# Patient Record
Sex: FEMALE | Race: OTHER | HISPANIC OR LATINO
[De-identification: names, ages, dates, MRNs, and addresses within clinical notes are randomized per-mention and may not be internally consistent; named-entity substitution may affect disease eponyms.]

---

## 2019-02-14 ENCOUNTER — RESULT REVIEW (OUTPATIENT)
Age: 49
End: 2019-02-14

## 2019-02-14 ENCOUNTER — TRANSCRIPTION ENCOUNTER (OUTPATIENT)
Age: 49
End: 2019-02-14

## 2019-02-14 ENCOUNTER — INPATIENT (INPATIENT)
Facility: HOSPITAL | Age: 49
LOS: 4 days | Discharge: HOME | DRG: 346 | End: 2019-02-19
Attending: SPECIALIST | Admitting: SPECIALIST
Payer: MEDICAID

## 2019-02-14 VITALS
WEIGHT: 149.91 LBS | HEART RATE: 90 BPM | TEMPERATURE: 98 F | RESPIRATION RATE: 20 BRPM | SYSTOLIC BLOOD PRESSURE: 143 MMHG | OXYGEN SATURATION: 99 % | DIASTOLIC BLOOD PRESSURE: 88 MMHG | HEIGHT: 64 IN

## 2019-02-14 DIAGNOSIS — K63.5 POLYP OF COLON: ICD-10-CM

## 2019-02-14 DIAGNOSIS — Z90.49 ACQUIRED ABSENCE OF OTHER SPECIFIED PARTS OF DIGESTIVE TRACT: Chronic | ICD-10-CM

## 2019-02-14 LAB
ABO RH CONFIRMATION: SIGNIFICANT CHANGE UP
ALBUMIN SERPL ELPH-MCNC: 3.7 G/DL — SIGNIFICANT CHANGE UP (ref 3.5–5)
ALP SERPL-CCNC: 100 U/L — SIGNIFICANT CHANGE UP (ref 40–120)
ALT FLD-CCNC: 24 U/L DA — SIGNIFICANT CHANGE UP (ref 10–60)
ANION GAP SERPL CALC-SCNC: 8 MMOL/L — SIGNIFICANT CHANGE UP (ref 5–17)
APPEARANCE UR: ABNORMAL
AST SERPL-CCNC: 21 U/L — SIGNIFICANT CHANGE UP (ref 10–40)
BASOPHILS # BLD AUTO: 0.02 K/UL — SIGNIFICANT CHANGE UP (ref 0–0.2)
BASOPHILS NFR BLD AUTO: 0.4 % — SIGNIFICANT CHANGE UP (ref 0–2)
BILIRUB SERPL-MCNC: 0.6 MG/DL — SIGNIFICANT CHANGE UP (ref 0.2–1.2)
BILIRUB UR-MCNC: NEGATIVE — SIGNIFICANT CHANGE UP
BLD GP AB SCN SERPL QL: SIGNIFICANT CHANGE UP
BUN SERPL-MCNC: 10 MG/DL — SIGNIFICANT CHANGE UP (ref 7–18)
CALCIUM SERPL-MCNC: 8.7 MG/DL — SIGNIFICANT CHANGE UP (ref 8.4–10.5)
CHLORIDE SERPL-SCNC: 107 MMOL/L — SIGNIFICANT CHANGE UP (ref 96–108)
CO2 SERPL-SCNC: 24 MMOL/L — SIGNIFICANT CHANGE UP (ref 22–31)
COLOR SPEC: YELLOW — SIGNIFICANT CHANGE UP
CREAT SERPL-MCNC: 0.7 MG/DL — SIGNIFICANT CHANGE UP (ref 0.5–1.3)
DIFF PNL FLD: NEGATIVE — SIGNIFICANT CHANGE UP
EOSINOPHIL # BLD AUTO: 0.09 K/UL — SIGNIFICANT CHANGE UP (ref 0–0.5)
EOSINOPHIL NFR BLD AUTO: 1.7 % — SIGNIFICANT CHANGE UP (ref 0–6)
GLUCOSE SERPL-MCNC: 90 MG/DL — SIGNIFICANT CHANGE UP (ref 70–99)
GLUCOSE UR QL: NEGATIVE — SIGNIFICANT CHANGE UP
HCG SERPL-ACNC: <1 MIU/ML — SIGNIFICANT CHANGE UP
HCT VFR BLD CALC: 38.5 % — SIGNIFICANT CHANGE UP (ref 34.5–45)
HGB BLD-MCNC: 12.5 G/DL — SIGNIFICANT CHANGE UP (ref 11.5–15.5)
IMM GRANULOCYTES NFR BLD AUTO: 0.2 % — SIGNIFICANT CHANGE UP (ref 0–1.5)
KETONES UR-MCNC: NEGATIVE — SIGNIFICANT CHANGE UP
LEUKOCYTE ESTERASE UR-ACNC: ABNORMAL
LIDOCAIN IGE QN: 163 U/L — SIGNIFICANT CHANGE UP (ref 73–393)
LYMPHOCYTES # BLD AUTO: 1.53 K/UL — SIGNIFICANT CHANGE UP (ref 1–3.3)
LYMPHOCYTES # BLD AUTO: 28.2 % — SIGNIFICANT CHANGE UP (ref 13–44)
MCHC RBC-ENTMCNC: 27.5 PG — SIGNIFICANT CHANGE UP (ref 27–34)
MCHC RBC-ENTMCNC: 32.5 GM/DL — SIGNIFICANT CHANGE UP (ref 32–36)
MCV RBC AUTO: 84.6 FL — SIGNIFICANT CHANGE UP (ref 80–100)
MONOCYTES # BLD AUTO: 0.58 K/UL — SIGNIFICANT CHANGE UP (ref 0–0.9)
MONOCYTES NFR BLD AUTO: 10.7 % — SIGNIFICANT CHANGE UP (ref 2–14)
NEUTROPHILS # BLD AUTO: 3.19 K/UL — SIGNIFICANT CHANGE UP (ref 1.8–7.4)
NEUTROPHILS NFR BLD AUTO: 58.8 % — SIGNIFICANT CHANGE UP (ref 43–77)
NITRITE UR-MCNC: NEGATIVE — SIGNIFICANT CHANGE UP
NRBC # BLD: 0 /100 WBCS — SIGNIFICANT CHANGE UP (ref 0–0)
PH UR: 6 — SIGNIFICANT CHANGE UP (ref 5–8)
PLATELET # BLD AUTO: 262 K/UL — SIGNIFICANT CHANGE UP (ref 150–400)
POTASSIUM SERPL-MCNC: 3.9 MMOL/L — SIGNIFICANT CHANGE UP (ref 3.5–5.3)
POTASSIUM SERPL-SCNC: 3.9 MMOL/L — SIGNIFICANT CHANGE UP (ref 3.5–5.3)
PROT SERPL-MCNC: 7.6 G/DL — SIGNIFICANT CHANGE UP (ref 6–8.3)
PROT UR-MCNC: 15
RBC # BLD: 4.55 M/UL — SIGNIFICANT CHANGE UP (ref 3.8–5.2)
RBC # FLD: 18.8 % — HIGH (ref 10.3–14.5)
SODIUM SERPL-SCNC: 139 MMOL/L — SIGNIFICANT CHANGE UP (ref 135–145)
SP GR SPEC: 1 — LOW (ref 1.01–1.02)
UROBILINOGEN FLD QL: NEGATIVE — SIGNIFICANT CHANGE UP
WBC # BLD: 5.42 K/UL — SIGNIFICANT CHANGE UP (ref 3.8–10.5)
WBC # FLD AUTO: 5.42 K/UL — SIGNIFICANT CHANGE UP (ref 3.8–10.5)

## 2019-02-14 PROCEDURE — 99223 1ST HOSP IP/OBS HIGH 75: CPT | Mod: 57

## 2019-02-14 PROCEDURE — 99285 EMERGENCY DEPT VISIT HI MDM: CPT

## 2019-02-14 PROCEDURE — 71046 X-RAY EXAM CHEST 2 VIEWS: CPT | Mod: 26

## 2019-02-14 PROCEDURE — 88309 TISSUE EXAM BY PATHOLOGIST: CPT | Mod: 26

## 2019-02-14 RX ORDER — SOD SULF/SODIUM/NAHCO3/KCL/PEG
4000 SOLUTION, RECONSTITUTED, ORAL ORAL ONCE
Qty: 0 | Refills: 0 | Status: DISCONTINUED | OUTPATIENT
Start: 2019-02-14 | End: 2019-02-15

## 2019-02-14 RX ORDER — ENOXAPARIN SODIUM 100 MG/ML
40 INJECTION SUBCUTANEOUS EVERY 24 HOURS
Qty: 0 | Refills: 0 | Status: DISCONTINUED | OUTPATIENT
Start: 2019-02-14 | End: 2019-02-19

## 2019-02-14 RX ORDER — NEOMYCIN SULFATE 500 MG/1
1000 TABLET ORAL
Qty: 0 | Refills: 0 | Status: DISCONTINUED | OUTPATIENT
Start: 2019-02-14 | End: 2019-02-15

## 2019-02-14 RX ORDER — ACETAMINOPHEN 500 MG
650 TABLET ORAL EVERY 6 HOURS
Qty: 0 | Refills: 0 | Status: DISCONTINUED | OUTPATIENT
Start: 2019-02-14 | End: 2019-02-15

## 2019-02-14 RX ORDER — SODIUM CHLORIDE 9 MG/ML
1000 INJECTION, SOLUTION INTRAVENOUS
Qty: 0 | Refills: 0 | Status: DISCONTINUED | OUTPATIENT
Start: 2019-02-14 | End: 2019-02-19

## 2019-02-14 RX ORDER — ONDANSETRON 8 MG/1
4 TABLET, FILM COATED ORAL EVERY 6 HOURS
Qty: 0 | Refills: 0 | Status: DISCONTINUED | OUTPATIENT
Start: 2019-02-14 | End: 2019-02-19

## 2019-02-14 RX ORDER — ERYTHROMYCIN ETHYLSUCCINATE 400 MG
1000 TABLET ORAL
Qty: 0 | Refills: 0 | Status: DISCONTINUED | OUTPATIENT
Start: 2019-02-14 | End: 2019-02-15

## 2019-02-14 RX ORDER — KETOROLAC TROMETHAMINE 30 MG/ML
30 SYRINGE (ML) INJECTION ONCE
Qty: 0 | Refills: 0 | Status: DISCONTINUED | OUTPATIENT
Start: 2019-02-14 | End: 2019-02-15

## 2019-02-14 RX ADMIN — NEOMYCIN SULFATE 1000 MILLIGRAM(S): 500 TABLET ORAL at 17:50

## 2019-02-14 RX ADMIN — Medication 1000 MILLIGRAM(S): at 17:51

## 2019-02-14 RX ADMIN — Medication 1000 MILLIGRAM(S): at 14:12

## 2019-02-14 RX ADMIN — SODIUM CHLORIDE 110 MILLILITER(S): 9 INJECTION, SOLUTION INTRAVENOUS at 16:03

## 2019-02-14 RX ADMIN — NEOMYCIN SULFATE 1000 MILLIGRAM(S): 500 TABLET ORAL at 14:11

## 2019-02-14 NOTE — ED PROVIDER NOTE - CLINICAL SUMMARY MEDICAL DECISION MAKING FREE TEXT BOX
50 y/o F pt with hx of polyps, sent here for evaluation by general surgery. Spoke with general surgery who will evaluate patient.

## 2019-02-14 NOTE — ED PROVIDER NOTE - OBJECTIVE STATEMENT
# [id number]    48 y/o F pt with a PMHx of polyps, presents to the ED sent to see Dr. Orlando. Patient reports she was told to come through the ER at 9am because he has to get surgery done to remove polyps. Patient states Dr. Orlando told her to come do the ED but he wants to see her first. Patient denies pain, vaginal bleeding, nausea, vomiting, fevers, dysuria, urinary urgency, urinary frequency, or any other symptoms.

## 2019-02-14 NOTE — H&P ADULT - ATTENDING COMMENTS
Agree with above  Condition discussed with patient and family, options risks and benefits explained  Laparoscopic assisted transverse colon resection today

## 2019-02-14 NOTE — H&P ADULT - HISTORY OF PRESENT ILLNESS
49 y.o. F presents to [facility] ER c/o intermittent BRBPR for the past 3 weeks. Pt states she had a colonoscopy at [facility] 3 weeks ago and was found to have a tubulovillous traverse colon mass. Tolerating diet and having normal BM's in between episodes. Pt denies fever, chills, N/V, abd pain, diarrhea, constipation, unintentional weight loss, dizziness.

## 2019-02-14 NOTE — H&P ADULT - PROBLEM SELECTOR PLAN 1
Admit to surgery under Dr. Peace  Plan for lap assisted colon resection 2/15/19  Bowel prep today  NPO after midnight except PO meds  IVF when NPO  CXR  EKG  Medical clearance - Dr. Miles consulted  T&S  Hold 2U PRBC for OR  No need for transfusion at present time

## 2019-02-15 LAB — GLUCOSE BLDC GLUCOMTR-MCNC: 106 MG/DL — HIGH (ref 70–99)

## 2019-02-15 PROCEDURE — 44204 LAPARO PARTIAL COLECTOMY: CPT | Mod: AS

## 2019-02-15 RX ORDER — HYDROMORPHONE HYDROCHLORIDE 2 MG/ML
1 INJECTION INTRAMUSCULAR; INTRAVENOUS; SUBCUTANEOUS EVERY 4 HOURS
Qty: 0 | Refills: 0 | Status: DISCONTINUED | OUTPATIENT
Start: 2019-02-15 | End: 2019-02-19

## 2019-02-15 RX ORDER — ACETAMINOPHEN 500 MG
1000 TABLET ORAL ONCE
Qty: 0 | Refills: 0 | Status: COMPLETED | OUTPATIENT
Start: 2019-02-15 | End: 2019-02-15

## 2019-02-15 RX ORDER — ACETAMINOPHEN 500 MG
1000 TABLET ORAL ONCE
Qty: 0 | Refills: 0 | Status: COMPLETED | OUTPATIENT
Start: 2019-02-16 | End: 2019-02-16

## 2019-02-15 RX ORDER — HYDROMORPHONE HYDROCHLORIDE 2 MG/ML
0.5 INJECTION INTRAMUSCULAR; INTRAVENOUS; SUBCUTANEOUS
Qty: 0 | Refills: 0 | Status: DISCONTINUED | OUTPATIENT
Start: 2019-02-15 | End: 2019-02-15

## 2019-02-15 RX ADMIN — Medication 30 MILLIGRAM(S): at 13:09

## 2019-02-15 RX ADMIN — Medication 30 MILLIGRAM(S): at 13:24

## 2019-02-15 RX ADMIN — NEOMYCIN SULFATE 1000 MILLIGRAM(S): 500 TABLET ORAL at 02:53

## 2019-02-15 RX ADMIN — ENOXAPARIN SODIUM 40 MILLIGRAM(S): 100 INJECTION SUBCUTANEOUS at 02:53

## 2019-02-15 RX ADMIN — HYDROMORPHONE HYDROCHLORIDE 0.5 MILLIGRAM(S): 2 INJECTION INTRAMUSCULAR; INTRAVENOUS; SUBCUTANEOUS at 13:09

## 2019-02-15 RX ADMIN — Medication 400 MILLIGRAM(S): at 20:46

## 2019-02-15 RX ADMIN — ONDANSETRON 4 MILLIGRAM(S): 8 TABLET, FILM COATED ORAL at 14:49

## 2019-02-15 RX ADMIN — Medication 1000 MILLIGRAM(S): at 21:01

## 2019-02-15 RX ADMIN — HYDROMORPHONE HYDROCHLORIDE 0.5 MILLIGRAM(S): 2 INJECTION INTRAMUSCULAR; INTRAVENOUS; SUBCUTANEOUS at 13:24

## 2019-02-15 RX ADMIN — Medication 1000 MILLIGRAM(S): at 03:00

## 2019-02-15 NOTE — PROGRESS NOTE ADULT - SUBJECTIVE AND OBJECTIVE BOX
INTERVAL HPI/OVERNIGHT EVENTS:  Pt stable.   NPO   C/O minimal abdominal pain    Vital Signs Last 24 Hrs  T(C): 36.6 (15 Feb 2019 14:44), Max: 36.9 (15 Feb 2019 00:44)  T(F): 97.8 (15 Feb 2019 14:44), Max: 98.4 (15 Feb 2019 00:44)  HR: 85 (15 Feb 2019 14:44) (63 - 91)  BP: 123/74 (15 Feb 2019 14:44) (117/73 - 186/98)  BP(mean): 98 (15 Feb 2019 13:54) (98 - 122)  RR: 16 (15 Feb 2019 14:44) (13 - 20)  SpO2: 99% (15 Feb 2019 14:44) (96% - 100%)    Physical:  Abdomen: Soft nondistended, nontender.  Port sites clean  ELIA dressing in place    I&O's Summary    15 Feb 2019 07:01  -  15 Feb 2019 17:53  --------------------------------------------------------  IN: 1110 mL / OUT: 300 mL / NET: 810 mL        LABS:                        12.5   5.42  )-----------( 262      ( 14 Feb 2019 10:27 )             38.5             02-14    139  |  107  |  10  ----------------------------<  90  3.9   |  24  |  0.70    Ca    8.7      14 Feb 2019 10:27    TPro  7.6  /  Alb  3.7  /  TBili  0.6  /  DBili  x   /  AST  21  /  ALT  24  /  AlkPhos  100  02-14

## 2019-02-15 NOTE — PROGRESS NOTE ADULT - SUBJECTIVE AND OBJECTIVE BOX
s/p Partial Transverse Colectomy      Patient examined at bedside, having some abdominal pain   No nausea, no vomiting  [place] in place  NPO  Began having her menses postoperatively        T(F): 97.8 (02-15-19 @ 14:44), Max: 98.4 (02-15-19 @ 00:44)  HR: 85 (02-15-19 @ 14:44) (63 - 91)  BP: 123/74 (02-15-19 @ 14:44) (117/73 - 186/98)  RR: 16 (02-15-19 @ 14:44) (13 - 20)  SpO2: 99% (02-15-19 @ 14:44) (96% - 100%)  Wt(kg): --      02-15 @ 07:01  -  02-15 @ 19:23  --------------------------------------------------------  IN:    Lactated Ringers IV Bolus: 1000 mL    sodium chloride 0.45%.: 110 mL  Total IN: 1110 mL    OUT:    Indwelling Catheter - Urethral: 150 mL    Indwelling Catheter - Urethral: 150 mL  Total OUT: 300 mL    Total NET: 810 mL          Physical Exam  General: AAOx3, No acute distress  Skin: No jaundice, no icterus  Abdomen: soft, incisional tenderness, nondistended, no rebound tenderness, no guarding, no palpable masses, ELIA in place  Extremities: non edematous, no calf pain bilaterally

## 2019-02-15 NOTE — PROGRESS NOTE ADULT - ASSESSMENT
50 y/o female s/p Partial Transverse Colectomy for a tubovillous adenoma, POST-OP Stable      1. Sips of water & ice chips  2. DVT PPx  3. Keep rodriguez, d/c in AM  4. Pain management  5. Incentive Spirometer  6. Out of bed

## 2019-02-15 NOTE — BRIEF OPERATIVE NOTE - PROCEDURE
<<-----Click on this checkbox to enter Procedure Colectomy, transverse colon, partial, laparoscopic  02/15/2019    Active  CFUNFGELD

## 2019-02-16 LAB
ANION GAP SERPL CALC-SCNC: 8 MMOL/L — SIGNIFICANT CHANGE UP (ref 5–17)
BUN SERPL-MCNC: 9 MG/DL — SIGNIFICANT CHANGE UP (ref 7–18)
CALCIUM SERPL-MCNC: 7.6 MG/DL — LOW (ref 8.4–10.5)
CHLORIDE SERPL-SCNC: 108 MMOL/L — SIGNIFICANT CHANGE UP (ref 96–108)
CO2 SERPL-SCNC: 24 MMOL/L — SIGNIFICANT CHANGE UP (ref 22–31)
CREAT SERPL-MCNC: 0.6 MG/DL — SIGNIFICANT CHANGE UP (ref 0.5–1.3)
GLUCOSE SERPL-MCNC: 107 MG/DL — HIGH (ref 70–99)
HCT VFR BLD CALC: 24.9 % — LOW (ref 34.5–45)
HCT VFR BLD CALC: 25.2 % — LOW (ref 34.5–45)
HGB BLD-MCNC: 8.1 G/DL — LOW (ref 11.5–15.5)
HGB BLD-MCNC: 8.2 G/DL — LOW (ref 11.5–15.5)
MAGNESIUM SERPL-MCNC: 2.1 MG/DL — SIGNIFICANT CHANGE UP (ref 1.6–2.6)
MCHC RBC-ENTMCNC: 27.7 PG — SIGNIFICANT CHANGE UP (ref 27–34)
MCHC RBC-ENTMCNC: 27.8 PG — SIGNIFICANT CHANGE UP (ref 27–34)
MCHC RBC-ENTMCNC: 32.5 GM/DL — SIGNIFICANT CHANGE UP (ref 32–36)
MCHC RBC-ENTMCNC: 32.5 GM/DL — SIGNIFICANT CHANGE UP (ref 32–36)
MCV RBC AUTO: 85.3 FL — SIGNIFICANT CHANGE UP (ref 80–100)
MCV RBC AUTO: 85.4 FL — SIGNIFICANT CHANGE UP (ref 80–100)
NRBC # BLD: 0 /100 WBCS — SIGNIFICANT CHANGE UP (ref 0–0)
NRBC # BLD: 0 /100 WBCS — SIGNIFICANT CHANGE UP (ref 0–0)
PHOSPHATE SERPL-MCNC: 3 MG/DL — SIGNIFICANT CHANGE UP (ref 2.5–4.5)
PLATELET # BLD AUTO: 216 K/UL — SIGNIFICANT CHANGE UP (ref 150–400)
PLATELET # BLD AUTO: 221 K/UL — SIGNIFICANT CHANGE UP (ref 150–400)
POTASSIUM SERPL-MCNC: 3.5 MMOL/L — SIGNIFICANT CHANGE UP (ref 3.5–5.3)
POTASSIUM SERPL-SCNC: 3.5 MMOL/L — SIGNIFICANT CHANGE UP (ref 3.5–5.3)
RBC # BLD: 2.92 M/UL — LOW (ref 3.8–5.2)
RBC # BLD: 2.95 M/UL — LOW (ref 3.8–5.2)
RBC # FLD: 18.6 % — HIGH (ref 10.3–14.5)
RBC # FLD: 18.7 % — HIGH (ref 10.3–14.5)
SODIUM SERPL-SCNC: 140 MMOL/L — SIGNIFICANT CHANGE UP (ref 135–145)
WBC # BLD: 11.63 K/UL — HIGH (ref 3.8–10.5)
WBC # BLD: 11.72 K/UL — HIGH (ref 3.8–10.5)
WBC # FLD AUTO: 11.63 K/UL — HIGH (ref 3.8–10.5)
WBC # FLD AUTO: 11.72 K/UL — HIGH (ref 3.8–10.5)

## 2019-02-16 RX ADMIN — HYDROMORPHONE HYDROCHLORIDE 1 MILLIGRAM(S): 2 INJECTION INTRAMUSCULAR; INTRAVENOUS; SUBCUTANEOUS at 20:30

## 2019-02-16 RX ADMIN — Medication 400 MILLIGRAM(S): at 07:53

## 2019-02-16 RX ADMIN — HYDROMORPHONE HYDROCHLORIDE 1 MILLIGRAM(S): 2 INJECTION INTRAMUSCULAR; INTRAVENOUS; SUBCUTANEOUS at 20:17

## 2019-02-16 RX ADMIN — ENOXAPARIN SODIUM 40 MILLIGRAM(S): 100 INJECTION SUBCUTANEOUS at 02:03

## 2019-02-16 RX ADMIN — Medication 400 MILLIGRAM(S): at 01:33

## 2019-02-16 RX ADMIN — Medication 1000 MILLIGRAM(S): at 08:50

## 2019-02-16 RX ADMIN — Medication 1000 MILLIGRAM(S): at 02:03

## 2019-02-16 NOTE — PROGRESS NOTE ADULT - SUBJECTIVE AND OBJECTIVE BOX
INTERVAL HPI/OVERNIGHT EVENTS:  Pt resting comfortably. c/o epigastric pain.  NPO.  -flatus/BM.   Denies N/V    MEDICATIONS  (STANDING):  enoxaparin Injectable 40 milliGRAM(s) SubCutaneous every 24 hours  sodium chloride 0.45%. 1000 milliLiter(s) (110 mL/Hr) IV Continuous <Continuous>    MEDICATIONS  (PRN):  HYDROmorphone  Injectable 1 milliGRAM(s) IV Push every 4 hours PRN Severe Pain (7 - 10)  ondansetron Injectable 4 milliGRAM(s) IV Push every 6 hours PRN Nausea      Vital Signs Last 24 Hrs  T(C): 37.2 (16 Feb 2019 04:59), Max: 37.2 (16 Feb 2019 00:15)  T(F): 99 (16 Feb 2019 04:59), Max: 99 (16 Feb 2019 04:59)  HR: 83 (16 Feb 2019 04:59) (69 - 91)  BP: 119/69 (16 Feb 2019 04:59) (108/64 - 186/98)  BP(mean): 98 (15 Feb 2019 13:54) (98 - 122)  RR: 16 (16 Feb 2019 04:59) (13 - 20)  SpO2: 99% (16 Feb 2019 04:59) (96% - 100%)    Physical:  General: A&Ox3. NAD.  Abdomen: Soft nondistended, ELIA dressing in place at upper abd with good seal.    I&O's Detail    15 Feb 2019 07:01  -  16 Feb 2019 07:00  --------------------------------------------------------  IN:    Lactated Ringers IV Bolus: 1000 mL    sodium chloride 0.45%.: 1540 mL  Total IN: 2540 mL    OUT:    Indwelling Catheter - Urethral: 150 mL    Indwelling Catheter - Urethral: 700 mL  Total OUT: 850 mL    Total NET: 1690 mL    LABS:                        8.1    11.72 )-----------( 221      ( 16 Feb 2019 06:53 )             24.9             02-16    140  |  108  |  9   ----------------------------<  107<H>  3.5   |  24  |  0.60    Ca    7.6<L>      16 Feb 2019 06:53  Phos  3.0     02-16  Mg     2.1     02-16    TPro  7.6  /  Alb  3.7  /  TBili  0.6  /  DBili  x   /  AST  21  /  ALT  24  /  AlkPhos  100  02-14

## 2019-02-17 LAB
ANION GAP SERPL CALC-SCNC: 10 MMOL/L — SIGNIFICANT CHANGE UP (ref 5–17)
BUN SERPL-MCNC: 7 MG/DL — SIGNIFICANT CHANGE UP (ref 7–18)
CALCIUM SERPL-MCNC: 8.2 MG/DL — LOW (ref 8.4–10.5)
CHLORIDE SERPL-SCNC: 105 MMOL/L — SIGNIFICANT CHANGE UP (ref 96–108)
CO2 SERPL-SCNC: 24 MMOL/L — SIGNIFICANT CHANGE UP (ref 22–31)
CREAT SERPL-MCNC: 0.57 MG/DL — SIGNIFICANT CHANGE UP (ref 0.5–1.3)
GLUCOSE SERPL-MCNC: 76 MG/DL — SIGNIFICANT CHANGE UP (ref 70–99)
HCT VFR BLD CALC: 27.8 % — LOW (ref 34.5–45)
HGB BLD-MCNC: 8.9 G/DL — LOW (ref 11.5–15.5)
MCHC RBC-ENTMCNC: 27.3 PG — SIGNIFICANT CHANGE UP (ref 27–34)
MCHC RBC-ENTMCNC: 32 GM/DL — SIGNIFICANT CHANGE UP (ref 32–36)
MCV RBC AUTO: 85.3 FL — SIGNIFICANT CHANGE UP (ref 80–100)
NRBC # BLD: 0 /100 WBCS — SIGNIFICANT CHANGE UP (ref 0–0)
PLATELET # BLD AUTO: 247 K/UL — SIGNIFICANT CHANGE UP (ref 150–400)
POTASSIUM SERPL-MCNC: 3.5 MMOL/L — SIGNIFICANT CHANGE UP (ref 3.5–5.3)
POTASSIUM SERPL-SCNC: 3.5 MMOL/L — SIGNIFICANT CHANGE UP (ref 3.5–5.3)
RBC # BLD: 3.26 M/UL — LOW (ref 3.8–5.2)
RBC # FLD: 18.2 % — HIGH (ref 10.3–14.5)
SODIUM SERPL-SCNC: 139 MMOL/L — SIGNIFICANT CHANGE UP (ref 135–145)
WBC # BLD: 8.85 K/UL — SIGNIFICANT CHANGE UP (ref 3.8–10.5)
WBC # FLD AUTO: 8.85 K/UL — SIGNIFICANT CHANGE UP (ref 3.8–10.5)

## 2019-02-17 RX ADMIN — ENOXAPARIN SODIUM 40 MILLIGRAM(S): 100 INJECTION SUBCUTANEOUS at 01:39

## 2019-02-17 NOTE — DIETITIAN INITIAL EVALUATION ADULT. - PERTINENT MEDS FT
reviewed  MEDICATIONS  (STANDING):  enoxaparin Injectable 40 milliGRAM(s) SubCutaneous every 24 hours  sodium chloride 0.45%. 1000 milliLiter(s) (110 mL/Hr) IV Continuous <Continuous>    MEDICATIONS  (PRN):  HYDROmorphone  Injectable 1 milliGRAM(s) IV Push every 4 hours PRN Severe Pain (7 - 10)  ondansetron Injectable 4 milliGRAM(s) IV Push every 6 hours PRN Nausea

## 2019-02-17 NOTE — DIETITIAN INITIAL EVALUATION ADULT. - OTHER INFO
Nutrition assessment for NPO status x 3 to 4d; lives home PTA; no pressure ulcer; denied GI distress, chewing or swallowing problem at present;

## 2019-02-17 NOTE — PROGRESS NOTE ADULT - PROBLEM SELECTOR PLAN 1
con't OOB ambulate  awaiting flatus and resolution of eructation to begin clears  pain control prn  ELIA dressing to self suction

## 2019-02-17 NOTE — DIETITIAN INITIAL EVALUATION ADULT. - SOURCE
patient/family/significant other/other (specify)/ at bedside; chart review. Pt speaks Azeri and some English, able to communicate well with help of some interpretation by family

## 2019-02-17 NOTE — PROGRESS NOTE ADULT - ATTENDING COMMENTS
As above  POD # 2 following resection of transvers colon    Stable  some belching     Wound dry ELIA dressing functioning well     HCT 27  Imp Progressing steadily  Plan  NPO, OOB

## 2019-02-17 NOTE — PROGRESS NOTE ADULT - SUBJECTIVE AND OBJECTIVE BOX
INTERVAL HPI/OVERNIGHT EVENTS:  Pt resting comfortably. No acute complaints.   States eructation, but no flatus.  Denies N/V    MEDICATIONS  (STANDING):  enoxaparin Injectable 40 milliGRAM(s) SubCutaneous every 24 hours  sodium chloride 0.45%. 1000 milliLiter(s) (110 mL/Hr) IV Continuous <Continuous>    MEDICATIONS  (PRN):  HYDROmorphone  Injectable 1 milliGRAM(s) IV Push every 4 hours PRN Severe Pain (7 - 10)  ondansetron Injectable 4 milliGRAM(s) IV Push every 6 hours PRN Nausea      Vital Signs Last 24 Hrs  T(C): 36.8 (17 Feb 2019 05:22), Max: 37 (16 Feb 2019 21:52)  T(F): 98.2 (17 Feb 2019 05:22), Max: 98.6 (16 Feb 2019 21:52)  HR: 73 (17 Feb 2019 05:22) (73 - 86)  BP: 129/72 (17 Feb 2019 05:22) (121/69 - 139/81)  BP(mean): --  RR: 17 (17 Feb 2019 05:22) (16 - 17)  SpO2: 97% (17 Feb 2019 05:22) (96% - 97%)    Physical:  General: A&Ox3. NAD.  Abdomen: Soft nondistended, nontender. Epigastric ELIA dressing with good seal. Mild saturation.    I&O's Detail    16 Feb 2019 07:01  -  17 Feb 2019 07:00  --------------------------------------------------------  IN:    sodium chloride 0.45%.: 2640 mL  Total IN: 2640 mL    OUT:  Total OUT: 0 mL    Total NET: 2640 mL    LABS:                        8.9    8.85  )-----------( 247      ( 17 Feb 2019 07:58 )             27.8             02-17    139  |  105  |  7   ----------------------------<  76  3.5   |  24  |  0.57    Ca    8.2<L>      17 Feb 2019 07:58  Phos  3.0     02-16  Mg     2.1     02-16

## 2019-02-18 LAB
ANION GAP SERPL CALC-SCNC: 11 MMOL/L — SIGNIFICANT CHANGE UP (ref 5–17)
BUN SERPL-MCNC: 13 MG/DL — SIGNIFICANT CHANGE UP (ref 7–18)
CALCIUM SERPL-MCNC: 8.3 MG/DL — LOW (ref 8.4–10.5)
CHLORIDE SERPL-SCNC: 107 MMOL/L — SIGNIFICANT CHANGE UP (ref 96–108)
CO2 SERPL-SCNC: 21 MMOL/L — LOW (ref 22–31)
CREAT SERPL-MCNC: 0.48 MG/DL — LOW (ref 0.5–1.3)
GLUCOSE SERPL-MCNC: 63 MG/DL — LOW (ref 70–99)
HCT VFR BLD CALC: 26.6 % — LOW (ref 34.5–45)
HCT VFR BLD CALC: 28.3 % — LOW (ref 34.5–45)
HGB BLD-MCNC: 8.6 G/DL — LOW (ref 11.5–15.5)
HGB BLD-MCNC: 9.2 G/DL — LOW (ref 11.5–15.5)
MCHC RBC-ENTMCNC: 27.7 PG — SIGNIFICANT CHANGE UP (ref 27–34)
MCHC RBC-ENTMCNC: 27.9 PG — SIGNIFICANT CHANGE UP (ref 27–34)
MCHC RBC-ENTMCNC: 32.3 GM/DL — SIGNIFICANT CHANGE UP (ref 32–36)
MCHC RBC-ENTMCNC: 32.5 GM/DL — SIGNIFICANT CHANGE UP (ref 32–36)
MCV RBC AUTO: 85.8 FL — SIGNIFICANT CHANGE UP (ref 80–100)
MCV RBC AUTO: 85.8 FL — SIGNIFICANT CHANGE UP (ref 80–100)
NRBC # BLD: 0 /100 WBCS — SIGNIFICANT CHANGE UP (ref 0–0)
NRBC # BLD: 0 /100 WBCS — SIGNIFICANT CHANGE UP (ref 0–0)
PLATELET # BLD AUTO: 245 K/UL — SIGNIFICANT CHANGE UP (ref 150–400)
PLATELET # BLD AUTO: 281 K/UL — SIGNIFICANT CHANGE UP (ref 150–400)
POTASSIUM SERPL-MCNC: 3.6 MMOL/L — SIGNIFICANT CHANGE UP (ref 3.5–5.3)
POTASSIUM SERPL-SCNC: 3.6 MMOL/L — SIGNIFICANT CHANGE UP (ref 3.5–5.3)
RBC # BLD: 3.1 M/UL — LOW (ref 3.8–5.2)
RBC # BLD: 3.3 M/UL — LOW (ref 3.8–5.2)
RBC # FLD: 17.5 % — HIGH (ref 10.3–14.5)
RBC # FLD: 17.5 % — HIGH (ref 10.3–14.5)
SODIUM SERPL-SCNC: 139 MMOL/L — SIGNIFICANT CHANGE UP (ref 135–145)
WBC # BLD: 6.92 K/UL — SIGNIFICANT CHANGE UP (ref 3.8–10.5)
WBC # BLD: 6.98 K/UL — SIGNIFICANT CHANGE UP (ref 3.8–10.5)
WBC # FLD AUTO: 6.92 K/UL — SIGNIFICANT CHANGE UP (ref 3.8–10.5)
WBC # FLD AUTO: 6.98 K/UL — SIGNIFICANT CHANGE UP (ref 3.8–10.5)

## 2019-02-18 RX ADMIN — SODIUM CHLORIDE 110 MILLILITER(S): 9 INJECTION, SOLUTION INTRAVENOUS at 05:51

## 2019-02-18 RX ADMIN — ENOXAPARIN SODIUM 40 MILLIGRAM(S): 100 INJECTION SUBCUTANEOUS at 02:26

## 2019-02-18 NOTE — PROGRESS NOTE ADULT - SUBJECTIVE AND OBJECTIVE BOX
POD # 3 following transverse colon resection    Looks and feels well      Abd soft non tender ELIA  drain functioning well         Had a bloody BM earlier but VSS, HCT unchanged   Imp  Stabel  likely old blood   Plan  Repeat Hct, begin clears

## 2019-02-19 ENCOUNTER — TRANSCRIPTION ENCOUNTER (OUTPATIENT)
Age: 49
End: 2019-02-19

## 2019-02-19 ENCOUNTER — INBOUND DOCUMENT (OUTPATIENT)
Age: 49
End: 2019-02-19

## 2019-02-19 VITALS
OXYGEN SATURATION: 99 % | HEART RATE: 82 BPM | SYSTOLIC BLOOD PRESSURE: 125 MMHG | TEMPERATURE: 99 F | RESPIRATION RATE: 17 BRPM | DIASTOLIC BLOOD PRESSURE: 77 MMHG

## 2019-02-19 LAB
ANION GAP SERPL CALC-SCNC: 5 MMOL/L — SIGNIFICANT CHANGE UP (ref 5–17)
BUN SERPL-MCNC: 9 MG/DL — SIGNIFICANT CHANGE UP (ref 7–18)
CALCIUM SERPL-MCNC: 8.1 MG/DL — LOW (ref 8.4–10.5)
CHLORIDE SERPL-SCNC: 106 MMOL/L — SIGNIFICANT CHANGE UP (ref 96–108)
CO2 SERPL-SCNC: 26 MMOL/L — SIGNIFICANT CHANGE UP (ref 22–31)
CREAT SERPL-MCNC: 0.56 MG/DL — SIGNIFICANT CHANGE UP (ref 0.5–1.3)
GLUCOSE SERPL-MCNC: 88 MG/DL — SIGNIFICANT CHANGE UP (ref 70–99)
HCT VFR BLD CALC: 26.2 % — LOW (ref 34.5–45)
HGB BLD-MCNC: 8.5 G/DL — LOW (ref 11.5–15.5)
MCHC RBC-ENTMCNC: 27.6 PG — SIGNIFICANT CHANGE UP (ref 27–34)
MCHC RBC-ENTMCNC: 32.4 GM/DL — SIGNIFICANT CHANGE UP (ref 32–36)
MCV RBC AUTO: 85.1 FL — SIGNIFICANT CHANGE UP (ref 80–100)
NRBC # BLD: 0 /100 WBCS — SIGNIFICANT CHANGE UP (ref 0–0)
PLATELET # BLD AUTO: 264 K/UL — SIGNIFICANT CHANGE UP (ref 150–400)
POTASSIUM SERPL-MCNC: 3.3 MMOL/L — LOW (ref 3.5–5.3)
POTASSIUM SERPL-SCNC: 3.3 MMOL/L — LOW (ref 3.5–5.3)
RBC # BLD: 3.08 M/UL — LOW (ref 3.8–5.2)
RBC # FLD: 17.3 % — HIGH (ref 10.3–14.5)
SODIUM SERPL-SCNC: 137 MMOL/L — SIGNIFICANT CHANGE UP (ref 135–145)
WBC # BLD: 6.32 K/UL — SIGNIFICANT CHANGE UP (ref 3.8–10.5)
WBC # FLD AUTO: 6.32 K/UL — SIGNIFICANT CHANGE UP (ref 3.8–10.5)

## 2019-02-19 PROCEDURE — 99285 EMERGENCY DEPT VISIT HI MDM: CPT | Mod: 25

## 2019-02-19 PROCEDURE — 80048 BASIC METABOLIC PNL TOTAL CA: CPT

## 2019-02-19 PROCEDURE — 71046 X-RAY EXAM CHEST 2 VIEWS: CPT

## 2019-02-19 PROCEDURE — 86900 BLOOD TYPING SEROLOGIC ABO: CPT

## 2019-02-19 PROCEDURE — 80053 COMPREHEN METABOLIC PANEL: CPT

## 2019-02-19 PROCEDURE — 36415 COLL VENOUS BLD VENIPUNCTURE: CPT

## 2019-02-19 PROCEDURE — 83735 ASSAY OF MAGNESIUM: CPT

## 2019-02-19 PROCEDURE — 86901 BLOOD TYPING SEROLOGIC RH(D): CPT

## 2019-02-19 PROCEDURE — 82962 GLUCOSE BLOOD TEST: CPT

## 2019-02-19 PROCEDURE — 88309 TISSUE EXAM BY PATHOLOGIST: CPT

## 2019-02-19 PROCEDURE — 81001 URINALYSIS AUTO W/SCOPE: CPT

## 2019-02-19 PROCEDURE — 86850 RBC ANTIBODY SCREEN: CPT

## 2019-02-19 PROCEDURE — 84702 CHORIONIC GONADOTROPIN TEST: CPT

## 2019-02-19 PROCEDURE — 83690 ASSAY OF LIPASE: CPT

## 2019-02-19 PROCEDURE — 86923 COMPATIBILITY TEST ELECTRIC: CPT

## 2019-02-19 PROCEDURE — 84100 ASSAY OF PHOSPHORUS: CPT

## 2019-02-19 PROCEDURE — 85027 COMPLETE CBC AUTOMATED: CPT

## 2019-02-19 RX ORDER — POTASSIUM CHLORIDE 20 MEQ
40 PACKET (EA) ORAL ONCE
Qty: 0 | Refills: 0 | Status: COMPLETED | OUTPATIENT
Start: 2019-02-19 | End: 2019-02-19

## 2019-02-19 RX ORDER — OXYCODONE AND ACETAMINOPHEN 5; 325 MG/1; MG/1
1 TABLET ORAL EVERY 4 HOURS
Qty: 0 | Refills: 0 | Status: DISCONTINUED | OUTPATIENT
Start: 2019-02-19 | End: 2019-02-19

## 2019-02-19 RX ADMIN — ENOXAPARIN SODIUM 40 MILLIGRAM(S): 100 INJECTION SUBCUTANEOUS at 02:15

## 2019-02-19 RX ADMIN — Medication 40 MILLIEQUIVALENT(S): at 11:32

## 2019-02-19 RX ADMIN — SODIUM CHLORIDE 110 MILLILITER(S): 9 INJECTION, SOLUTION INTRAVENOUS at 03:22

## 2019-02-19 NOTE — DISCHARGE NOTE ADULT - HOSPITAL COURSE
49 y.o. F presents to [facility] ER c/o intermittent BRBPR for the past 3 weeks. Pt states she had a colonoscopy at [facility] 3 weeks ago and was found to have a tubulovillous traverse colon mass. Tolerating diet and having normal BM's in between episodes. Pt denies fever, chills, N/V, abd pain, diarrhea, constipation, unintentional weight loss, dizziness. Patient was taken to the OR on 2/15 and underwent transverse colectomy. Patient tolerated procedure well. Diet was advanced and tolerated   Patient for d/c home with outpatient follow up

## 2019-02-19 NOTE — PROGRESS NOTE ADULT - ASSESSMENT
s/p transverse colectomy 2/15    1- adv to reg diet today  2- d/c planning later today if tolerates diet s/p transverse colectomy 2/15, hypokalemia    1- adv to reg diet today  2- d/c planning later today if tolerates diet  3- replete potassium

## 2019-02-19 NOTE — DISCHARGE NOTE ADULT - MEDICATION SUMMARY - MEDICATIONS TO TAKE
I will START or STAY ON the medications listed below when I get home from the hospital:    oxycodone-acetaminophen 5 mg-325 mg oral tablet  -- 1 tab(s) by mouth every 6 hours, As Needed -Moderate Pain (4 - 6) MDD:4 tabs  -- Indication: For Prn pain

## 2019-02-19 NOTE — DISCHARGE NOTE ADULT - PATIENT PORTAL LINK FT
You can access the [facility] Patient Portal, offered by [facility], by registering with the following website: [email or web address]

## 2019-02-19 NOTE — PROGRESS NOTE ADULT - SUBJECTIVE AND OBJECTIVE BOX
INTERVAL HPI/OVERNIGHT EVENTS:  Pt stable.   Tolerating diet.   flatus, regular BM this AM    Vital Signs Last 24 Hrs  T(C): 37 (19 Feb 2019 05:58), Max: 37 (18 Feb 2019 21:52)  T(F): 98.6 (19 Feb 2019 05:58), Max: 98.6 (18 Feb 2019 21:52)  HR: 82 (19 Feb 2019 05:58) (82 - 99)  BP: 125/77 (19 Feb 2019 05:58) (125/77 - 147/68)  BP(mean): --  RR: 17 (19 Feb 2019 05:58) (16 - 18)  SpO2: 99% (19 Feb 2019 05:58) (98% - 99%)    Physical:  Abdomen: Soft nondistended, nontender.  ELIA dressing in place    I&O's Summary      LABS:                        8.5    6.32  )-----------( 264      ( 19 Feb 2019 06:30 )             26.2             02-19    137  |  106  |  9   ----------------------------<  88  3.3<L>   |  26  |  0.56    Ca    8.1<L>      19 Feb 2019 06:30

## 2019-02-19 NOTE — DISCHARGE NOTE ADULT - CARE PROVIDER_API CALL
Matthew Peace)  Surgery  [address] [place] [address], [place] [address]  Phone: [phone]  Fax: [phone]  Follow Up Time: 1 week

## 2019-02-19 NOTE — DISCHARGE NOTE ADULT - PLAN OF CARE
Wound healing Please follow up Dr. Segovia within 1 week   Diet as tolerated   No heavy lifting or straining

## 2019-02-19 NOTE — PROGRESS NOTE ADULT - SUBJECTIVE AND OBJECTIVE BOX
49y Female with no overnight complaints. Tolerating clears.  +flatus/bm    Vital Signs:  T(C): 37 (02-19-19 @ 05:58), Max: 37 (02-18-19 @ 21:52)  HR: 82 (02-19-19 @ 05:58) (82 - 99)  BP: 125/77 (02-19-19 @ 05:58) (125/77 - 147/68)  RR: 17 (02-19-19 @ 05:58) (16 - 18)  SpO2: 99% (02-19-19 @ 05:58) (98% - 99%)  Wt(kg): --    Physical Exam:  General: NAD, comfortable  Abdomen: soft, NT/ND. ELIA dressing in place, dried blood noted                          8.5    6.32  )-----------( 264      ( 19 Feb 2019 06:30 )             26.2 49y Female with no overnight complaints. Tolerating clears.  +flatus/bm    Vital Signs:  T(C): 37 (02-19-19 @ 05:58), Max: 37 (02-18-19 @ 21:52)  HR: 82 (02-19-19 @ 05:58) (82 - 99)  BP: 125/77 (02-19-19 @ 05:58) (125/77 - 147/68)  RR: 17 (02-19-19 @ 05:58) (16 - 18)  SpO2: 99% (02-19-19 @ 05:58) (98% - 99%)  Wt(kg): --    Physical Exam:  General: NAD, comfortable  Abdomen: soft, NT/ND. ELIA dressing in place, dried blood noted                          8.5    6.32  )-----------( 264      ( 19 Feb 2019 06:30 )             26.2     02-19    137  |  106  |  9   ----------------------------<  88  3.3<L>   |  26  |  0.56    Ca    8.1<L>      19 Feb 2019 06:30

## 2019-02-19 NOTE — DISCHARGE NOTE ADULT - CARE PLAN
Principal Discharge DX:	Polyp of colon, unspecified part of colon, unspecified type  Goal:	Wound healing  Assessment and plan of treatment:	Please follow up Dr. Segovia within 1 week   Diet as tolerated   No heavy lifting or straining

## 2019-02-22 PROBLEM — Z78.9 NON-SMOKER: Status: ACTIVE | Noted: 2019-02-22

## 2019-02-22 PROBLEM — Z00.00 ENCOUNTER FOR PREVENTIVE HEALTH EXAMINATION: Status: ACTIVE | Noted: 2019-02-22

## 2019-02-22 LAB — SURGICAL PATHOLOGY STUDY: SIGNIFICANT CHANGE UP

## 2019-02-25 ENCOUNTER — APPOINTMENT (OUTPATIENT)
Dept: SURGERY | Facility: CLINIC | Age: 49
End: 2019-02-25
Payer: SELF-PAY

## 2019-02-25 VITALS
HEART RATE: 77 BPM | HEIGHT: 60 IN | BODY MASS INDEX: 27.48 KG/M2 | TEMPERATURE: 98.6 F | DIASTOLIC BLOOD PRESSURE: 80 MMHG | WEIGHT: 140 LBS | OXYGEN SATURATION: 100 % | SYSTOLIC BLOOD PRESSURE: 122 MMHG

## 2019-02-25 DIAGNOSIS — Z80.0 FAMILY HISTORY OF MALIGNANT NEOPLASM OF DIGESTIVE ORGANS: ICD-10-CM

## 2019-02-25 DIAGNOSIS — Z83.3 FAMILY HISTORY OF DIABETES MELLITUS: ICD-10-CM

## 2019-02-25 DIAGNOSIS — Z56.0 UNEMPLOYMENT, UNSPECIFIED: ICD-10-CM

## 2019-02-25 DIAGNOSIS — Z78.9 OTHER SPECIFIED HEALTH STATUS: ICD-10-CM

## 2019-02-25 PROCEDURE — 99024 POSTOP FOLLOW-UP VISIT: CPT

## 2019-02-25 SDOH — ECONOMIC STABILITY - INCOME SECURITY: UNEMPLOYMENT, UNSPECIFIED: Z56.0

## 2019-02-25 NOTE — PHYSICAL EXAM
[de-identified] : The patient is alert, well-groomed, and cheerful. [de-identified] : lower mid abdomen Wound is healing well.   no signs of  inflammation or infection.

## 2019-02-25 NOTE — PLAN
[FreeTextEntry1] : no heavy lifting  4-6 weeks.\par Follow up with Dr Marsh (GYN) \par patient will follow up in 3 months or if needed. Warning signs, follow up, and restrictions were discussed with the patient.

## 2019-02-25 NOTE — HISTORY OF PRESENT ILLNESS
[de-identified] : Patient is s/p transverse colectomy on 02/15/2019. patient offers no complaints. patient reports no fever, chills,  or  pain.  PICCO dressing intact. Surgical wound is healing well. No signs of inflammation, infection or exudate. Patient reports good bowel movements and appetite. \par

## 2019-02-25 NOTE — ASSESSMENT
[FreeTextEntry1] : Patient is s/p transverse colectomy on 02/15/2019. patient offers no complaints. patient reports no fever, chills,  or  pain.  PICCO dressing intact. Surgical wound is healing well. No signs of inflammation, infection or exudate. Patient reports good bowel movements and appetite. path results discussed

## 2019-02-25 NOTE — DATA REVIEWED
[FreeTextEntry1] : JAVAN ELIAS                   2\par \par \par \par Surgical Final Report\par \par \par \par \par Final Diagnosis\par \par Transverse colon, donut, distal margin; laparoscopically assisted\par colon resection:\par - Tubulovillous adenoma of transverse colon, completely excised.\par - Two pericolic lymph nodes negative for malignancy, 0/2.\par - Remaining colonic wall with no significant histopathological\par findings.\par \par Verified by: Lily Hernandez MD\par (Electronic Signature)\par Reported on: 02/22/19 10:20 EST, [address] [place] [address], [place],\[place] [address]\par _________________________________________________________________\par \par Comment\par Case reviewed intradepartmentally for .\par \par Clinical History\par Colon polyp\par Laparoscopically assisted colon resection

## 2019-04-08 ENCOUNTER — APPOINTMENT (OUTPATIENT)
Dept: SURGERY | Facility: CLINIC | Age: 49
End: 2019-04-08
Payer: SELF-PAY

## 2019-04-08 DIAGNOSIS — K63.9 DISEASE OF INTESTINE, UNSPECIFIED: ICD-10-CM

## 2019-04-08 DIAGNOSIS — L91.0 HYPERTROPHIC SCAR: ICD-10-CM

## 2019-04-08 PROCEDURE — 99024 POSTOP FOLLOW-UP VISIT: CPT

## 2019-04-08 NOTE — PLAN
[FreeTextEntry1] : patient will follow up in 6 months or if needed. Warning signs, follow up, and restrictions were discussed with the patient.

## 2019-04-08 NOTE — ASSESSMENT
[FreeTextEntry1] : Patient is s/p transverse colectomy on 02/15/2019.   today patient offers no complaints. patient reports no fever, chills,  or  pain.  Surgical wound is healing well. No signs of inflammation, infection or exudate. Hypertrophic scar.  Patient reports good bowel movements and appetite. patient reporting weight gain

## 2019-04-08 NOTE — HISTORY OF PRESENT ILLNESS
[de-identified] : Patient is s/p transverse colectomy on 02/15/2019.   today patient offers no complaints. patient reports no fever, chills,  or  pain.  Surgical wound is healing well. No signs of inflammation, infection or exudate. Hypertrophic scar.  Patient reports good bowel movements and appetite. patient reporting weight gain \par   [de-identified] : Patient reports no interval changes to her overall health status or medical history

## 2019-04-08 NOTE — PHYSICAL EXAM
[de-identified] : The patient is alert, well-groomed, and cheerful. [de-identified] : lower mid abdomen Wound is healing well.   no signs of  inflammation or infection. hypertrophic scar

## 2021-01-19 NOTE — PATIENT PROFILE ADULT - FUNCTIONAL SCREEN CURRENT LEVEL: DRESSING, MLM
0 = independent Zygomaticofacial Flap Text: Given the location of the defect, shape of the defect and the proximity to free margins a zygomaticofacial flap was deemed most appropriate for repair.  Using a sterile surgical marker, the appropriate flap was drawn incorporating the defect and placing the expected incisions within the relaxed skin tension lines where possible. The area thus outlined was incised deep to adipose tissue with a #15 scalpel blade with preservation of a vascular pedicle.  The skin margins were undermined to an appropriate distance in all directions utilizing iris scissors.  The flap was then placed into the defect and anchored with interrupted buried subcutaneous sutures.

## 2022-04-12 NOTE — ED ADULT NURSE NOTE - NSFALLRSKOUTCOME_ED_ALL_ED
Patient is here today for her 1 week postoperative visit.      Primary care provider: Ceferino Cohen MD     Latex:  Patient denies allergy to latex.  Medications reviewed with patient.  Tobacco use verified.  Allergies verified.    Patient would like communication of their results via:   [employer]    Patient's current [facility] status: Active.     Chaperone needed:          DECLINED INTERP   Universal Safety Interventions

## 2022-12-06 NOTE — PATIENT PROFILE ADULT - STATED REASON FOR ADMISSION
[FreeTextEntry1] : 60 y/o female w/ Hx of thyroid CA s/p thyroidectomy and iodine presents as follow up for joint pains.  \par Pt reports polyarthralgia including R>L knees (>1 year), b/l 1st CMCs (triggered by specific diet) (2-3 years), b/l shoulders, b/l ankles (stiffness when going down the stairs) (sporadic for 3 years). Reports swelling of ankles. Pt reports b/l knees looks bigger. Pt has not had any imaging done. Pt takes Advil PRN for the pains. \par Reports chronic rash of abdomen x 1.5 years since COVID infection, temporarily treated with dermatology creams. \par Reports SOB. Occasional recurrent diarrhea \par Reports Raynaud's some fingers turn white with pain in cold temperature. 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 Pt was offered L 1st CMC surgery but she declined. \par No family Hx of autoimmune disease. \par \par Pt with non-inflammatory polyarthralgia which has not been worked up yet. Low suspicion for autoimmune rheum diseases, I suspect R shoulder RCT, b/l knee OA or soft tissue injury with associated joint effusions. Labwork by me negative for signs of autoimmune inflammatory arthritis. XRs with few areas of IP OA, but otherwise normal. No concerns for underlying autoimmune rheumatologic diseases.\par \par Pt was tried on meloxicam daily PRN with good relief of polyarthralgia but right shoulder pain is refractory.\par \par - Obtain MR R shoulder - pain in R shoulder significant and refractory to meloxicam prescribed by me for >6 weeks.\par - c/w meloxicam 15mg daily PRN. Advised to see if taking every other day keeps the pain at bay. Given pt is taking high dose meloxicam regularly, pt should get her labwork checked regularly.\par - I advised that the NSAID should be taken with food. In addition while taking the prescribed NSAID, no over the counter or other NSAIDs should be used, such as ibuprofen (Motrin or Advil) or naproxen (Aleve) as this can cause stomach upset or other side effects. If needed for fever or breakthrough pain Tylenol can be used. \par - Discussed with patient at length about treatment of OA and soft tissue injuries including oral/topical analgesics, pain therapies (yoga, dwayne chi, acupuncture, chiropractor, massage therapy, wax therapy, etc.), PT/OT, steroid injections, surgeries. Advised on healthy diet, exercise, smoking avoidance, weight loss, stress management, sleep hygiene, control of comorbidities to help with management of pain and improve daily function. \par - Pt doing pilates regularly which helps with her pains - encouraged to continue. Advised to let me know if interested in PT \par - Will call pt with MR results. Pt can return earlier for R shoulder steroid injection if needed. RTC 3 months for follow up.\par  To follow up with results from previous colonoscopy

## 2024-05-10 NOTE — PROGRESS NOTE ADULT - ATTENDING COMMENTS
As above  POD #1 following resection of transverse colon for CA    Afebrile  VSS, comfortable     Abd  non distended wound clean ELIA draining nicely        ml/hr     HCT 24 ( 39)      Imp Stable  looks wel      Plan  OOB, D/C rodriguez in am  re check HCT As above  POD #1 following resection of transverse colon for CA    Afebrile  VSS, comfortable     Abd  non distended wound clean ELIA draining nicely        ml/hr     HCT 24 ( 39)      Imp Stable  looks well      Plan  OOB, D/C rodriguez in am  re check HCT Statement Selected

## 2024-08-19 PROBLEM — K63.89 MASS OF COLON: Status: ACTIVE | Noted: 2019-02-22

## 2024-08-19 NOTE — HISTORY OF PRESENT ILLNESS
[de-identified] : Patient is s/p transverse colectomy on 02/15/2019.   Patient's pathology results were  consistent with Tubulovillous adenoma of transverse colon, completely excised. - Two pericolic lymph nodes negative for malignancy, 0/2.

## 2024-08-26 ENCOUNTER — APPOINTMENT (OUTPATIENT)
Dept: SURGERY | Facility: CLINIC | Age: 54
End: 2024-08-26

## 2024-08-26 DIAGNOSIS — K63.89 OTHER SPECIFIED DISEASES OF INTESTINE: ICD-10-CM

## 2024-09-05 NOTE — ED PROVIDER NOTE - EYES, MLM
Patient called and with the aide of a Burmese interpretor, we read over her Burmese instructions with her and she verbalized understanding and spoke some English as well.     Clear bilaterally, pupils equal, round and reactive to light.

## 2024-09-10 NOTE — HISTORY OF PRESENT ILLNESS
[de-identified] : Patient is s/p transverse colectomy on 02/15/2019.   Patient's pathology results were  consistent with Tubulovillous adenoma of transverse colon, completely excised. - Two pericolic lymph nodes negative for malignancy, 0/2.

## 2024-09-30 ENCOUNTER — APPOINTMENT (OUTPATIENT)
Dept: SURGERY | Facility: CLINIC | Age: 54
End: 2024-09-30